# Patient Record
Sex: MALE | Race: WHITE | NOT HISPANIC OR LATINO | URBAN - METROPOLITAN AREA
[De-identification: names, ages, dates, MRNs, and addresses within clinical notes are randomized per-mention and may not be internally consistent; named-entity substitution may affect disease eponyms.]

---

## 2019-03-04 ENCOUNTER — EMERGENCY (EMERGENCY)
Facility: HOSPITAL | Age: 24
LOS: 1 days | Discharge: HOME | End: 2019-03-04
Attending: EMERGENCY MEDICINE
Payer: SELF-PAY

## 2019-03-04 VITALS
OXYGEN SATURATION: 99 % | SYSTOLIC BLOOD PRESSURE: 136 MMHG | RESPIRATION RATE: 16 BRPM | TEMPERATURE: 98 F | HEART RATE: 90 BPM | DIASTOLIC BLOOD PRESSURE: 82 MMHG

## 2019-03-04 PROCEDURE — 99283 EMERGENCY DEPT VISIT LOW MDM: CPT

## 2019-03-04 RX ORDER — DIAZEPAM 5 MG
1 TABLET ORAL
Qty: 9 | Refills: 0 | OUTPATIENT
Start: 2019-03-04 | End: 2019-03-06

## 2019-03-04 RX ORDER — IBUPROFEN 200 MG
1 TABLET ORAL
Qty: 40 | Refills: 0 | OUTPATIENT
Start: 2019-03-04 | End: 2019-03-13

## 2019-03-04 RX ORDER — IBUPROFEN 200 MG
600 TABLET ORAL ONCE
Qty: 0 | Refills: 0 | Status: COMPLETED | OUTPATIENT
Start: 2019-03-04 | End: 2019-03-04

## 2019-03-04 RX ADMIN — Medication 600 MILLIGRAM(S): at 13:36

## 2019-03-04 NOTE — ED PROVIDER NOTE - NSFOLLOWUPINSTRUCTIONS_ED_ALL_ED_FT
Any prolonged pain you can follow up with our spine center [id number]  Motrin 600 mg every 6 hours for pain  Valium 5 mg every 8 hours for extreme pain, you can not drive while taking this medication, most people prefer to take this before bed  Any new or worsening symptoms return to ER immediately

## 2019-03-04 NOTE — ED ADULT NURSE NOTE - NSIMPLEMENTINTERV_GEN_ALL_ED
Implemented All Universal Safety Interventions:  [facility] to call system. Call bell, personal items and telephone within reach. Instruct patient to call for assistance. Room bathroom lighting operational. Non-slip footwear when patient is off stretcher. Physically safe environment: no spills, clutter or unnecessary equipment. Stretcher in lowest position, wheels locked, appropriate side rails in place.

## 2019-03-04 NOTE — ED ADULT NURSE NOTE - OBJECTIVE STATEMENT
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 as per patient, patient slipped on slush and caught self from falling forwards. patient states lower back pain radiates to the anterior thigh. ambulatory. denies numbness or tingling. N/V/D, hitting head, LOC, incontinence, abdominal pain. skin intact. no swelling noted. VSS. PA at the beside.

## 2019-03-04 NOTE — ED PROVIDER NOTE - OBJECTIVE STATEMENT
23 y.o. male coming in with lower back pain after a slip and fall.  Pt started sliding on slipped ground, was falling forward but managed to catch himself with his right arm making his fall to the ground less severe however he did have a significant flexion in his LS spin as he went to the ground.  Did not hit his head, no neck pain, no other complaints of pain.  Currently with lower back pain with occasional sharp radiating do b/l hips and thighs.  No saddle anesthesia, no numbness, no weakness, no incontinence.  Has not taken anything for the pain, flexion makes it worse.

## 2019-03-04 NOTE — ED PROVIDER NOTE - ATTENDING CONTRIBUTION TO CARE
24 yo male who dorothea-knifed forward after slipping on ice but managed to catch himself before falling.  Jerking motion resulted in low back pain.  Able to ambulate with discomfort.  Otherwise well appearing on exam.  Conservative management, no imaging indicated @ this time.

## 2023-11-25 ENCOUNTER — EMERGENCY (EMERGENCY)
Facility: HOSPITAL | Age: 28
LOS: 1 days | Discharge: HOME | End: 2023-11-25
Attending: EMERGENCY MEDICINE | Admitting: EMERGENCY MEDICINE
Payer: COMMERCIAL

## 2023-11-25 VITALS
RESPIRATION RATE: 16 BRPM | SYSTOLIC BLOOD PRESSURE: 145 MMHG | OXYGEN SATURATION: 100 % | DIASTOLIC BLOOD PRESSURE: 96 MMHG | TEMPERATURE: 98 F | HEART RATE: 79 BPM

## 2023-11-25 VITALS
SYSTOLIC BLOOD PRESSURE: 129 MMHG | RESPIRATION RATE: 18 BRPM | HEART RATE: 74 BPM | DIASTOLIC BLOOD PRESSURE: 89 MMHG | OXYGEN SATURATION: 100 %

## 2023-11-25 LAB
ALBUMIN SERPL ELPH-MCNC: 4.5 G/DL — SIGNIFICANT CHANGE UP (ref 3.3–5)
ALBUMIN SERPL ELPH-MCNC: 4.5 G/DL — SIGNIFICANT CHANGE UP (ref 3.3–5)
ALP SERPL-CCNC: 56 U/L — SIGNIFICANT CHANGE UP (ref 40–120)
ALP SERPL-CCNC: 56 U/L — SIGNIFICANT CHANGE UP (ref 40–120)
ALT FLD-CCNC: 23 U/L — SIGNIFICANT CHANGE UP (ref 4–41)
ALT FLD-CCNC: 23 U/L — SIGNIFICANT CHANGE UP (ref 4–41)
ANION GAP SERPL CALC-SCNC: 11 MMOL/L — SIGNIFICANT CHANGE UP (ref 7–14)
ANION GAP SERPL CALC-SCNC: 11 MMOL/L — SIGNIFICANT CHANGE UP (ref 7–14)
AST SERPL-CCNC: 25 U/L — SIGNIFICANT CHANGE UP (ref 4–40)
AST SERPL-CCNC: 25 U/L — SIGNIFICANT CHANGE UP (ref 4–40)
BASOPHILS # BLD AUTO: 0.03 K/UL — SIGNIFICANT CHANGE UP (ref 0–0.2)
BASOPHILS # BLD AUTO: 0.03 K/UL — SIGNIFICANT CHANGE UP (ref 0–0.2)
BASOPHILS NFR BLD AUTO: 0.3 % — SIGNIFICANT CHANGE UP (ref 0–2)
BASOPHILS NFR BLD AUTO: 0.3 % — SIGNIFICANT CHANGE UP (ref 0–2)
BILIRUB SERPL-MCNC: <0.2 MG/DL — SIGNIFICANT CHANGE UP (ref 0.2–1.2)
BILIRUB SERPL-MCNC: <0.2 MG/DL — SIGNIFICANT CHANGE UP (ref 0.2–1.2)
BUN SERPL-MCNC: 18 MG/DL — SIGNIFICANT CHANGE UP (ref 7–23)
BUN SERPL-MCNC: 18 MG/DL — SIGNIFICANT CHANGE UP (ref 7–23)
CALCIUM SERPL-MCNC: 9.4 MG/DL — SIGNIFICANT CHANGE UP (ref 8.4–10.5)
CALCIUM SERPL-MCNC: 9.4 MG/DL — SIGNIFICANT CHANGE UP (ref 8.4–10.5)
CHLORIDE SERPL-SCNC: 103 MMOL/L — SIGNIFICANT CHANGE UP (ref 98–107)
CHLORIDE SERPL-SCNC: 103 MMOL/L — SIGNIFICANT CHANGE UP (ref 98–107)
CO2 SERPL-SCNC: 26 MMOL/L — SIGNIFICANT CHANGE UP (ref 22–31)
CO2 SERPL-SCNC: 26 MMOL/L — SIGNIFICANT CHANGE UP (ref 22–31)
CREAT SERPL-MCNC: 1.02 MG/DL — SIGNIFICANT CHANGE UP (ref 0.5–1.3)
CREAT SERPL-MCNC: 1.02 MG/DL — SIGNIFICANT CHANGE UP (ref 0.5–1.3)
EGFR: 101 ML/MIN/1.73M2 — SIGNIFICANT CHANGE UP
EGFR: 101 ML/MIN/1.73M2 — SIGNIFICANT CHANGE UP
EOSINOPHIL # BLD AUTO: 0.08 K/UL — SIGNIFICANT CHANGE UP (ref 0–0.5)
EOSINOPHIL # BLD AUTO: 0.08 K/UL — SIGNIFICANT CHANGE UP (ref 0–0.5)
EOSINOPHIL NFR BLD AUTO: 0.9 % — SIGNIFICANT CHANGE UP (ref 0–6)
EOSINOPHIL NFR BLD AUTO: 0.9 % — SIGNIFICANT CHANGE UP (ref 0–6)
GLUCOSE SERPL-MCNC: 95 MG/DL — SIGNIFICANT CHANGE UP (ref 70–99)
GLUCOSE SERPL-MCNC: 95 MG/DL — SIGNIFICANT CHANGE UP (ref 70–99)
HCT VFR BLD CALC: 43.2 % — SIGNIFICANT CHANGE UP (ref 39–50)
HCT VFR BLD CALC: 43.2 % — SIGNIFICANT CHANGE UP (ref 39–50)
HGB BLD-MCNC: 14.7 G/DL — SIGNIFICANT CHANGE UP (ref 13–17)
HGB BLD-MCNC: 14.7 G/DL — SIGNIFICANT CHANGE UP (ref 13–17)
IANC: 6.08 K/UL — SIGNIFICANT CHANGE UP (ref 1.8–7.4)
IANC: 6.08 K/UL — SIGNIFICANT CHANGE UP (ref 1.8–7.4)
IMM GRANULOCYTES NFR BLD AUTO: 0.2 % — SIGNIFICANT CHANGE UP (ref 0–0.9)
IMM GRANULOCYTES NFR BLD AUTO: 0.2 % — SIGNIFICANT CHANGE UP (ref 0–0.9)
LYMPHOCYTES # BLD AUTO: 1.77 K/UL — SIGNIFICANT CHANGE UP (ref 1–3.3)
LYMPHOCYTES # BLD AUTO: 1.77 K/UL — SIGNIFICANT CHANGE UP (ref 1–3.3)
LYMPHOCYTES # BLD AUTO: 20.4 % — SIGNIFICANT CHANGE UP (ref 13–44)
LYMPHOCYTES # BLD AUTO: 20.4 % — SIGNIFICANT CHANGE UP (ref 13–44)
MCHC RBC-ENTMCNC: 29.3 PG — SIGNIFICANT CHANGE UP (ref 27–34)
MCHC RBC-ENTMCNC: 29.3 PG — SIGNIFICANT CHANGE UP (ref 27–34)
MCHC RBC-ENTMCNC: 34 GM/DL — SIGNIFICANT CHANGE UP (ref 32–36)
MCHC RBC-ENTMCNC: 34 GM/DL — SIGNIFICANT CHANGE UP (ref 32–36)
MCV RBC AUTO: 86.1 FL — SIGNIFICANT CHANGE UP (ref 80–100)
MCV RBC AUTO: 86.1 FL — SIGNIFICANT CHANGE UP (ref 80–100)
MONOCYTES # BLD AUTO: 0.7 K/UL — SIGNIFICANT CHANGE UP (ref 0–0.9)
MONOCYTES # BLD AUTO: 0.7 K/UL — SIGNIFICANT CHANGE UP (ref 0–0.9)
MONOCYTES NFR BLD AUTO: 8.1 % — SIGNIFICANT CHANGE UP (ref 2–14)
MONOCYTES NFR BLD AUTO: 8.1 % — SIGNIFICANT CHANGE UP (ref 2–14)
NEUTROPHILS # BLD AUTO: 6.08 K/UL — SIGNIFICANT CHANGE UP (ref 1.8–7.4)
NEUTROPHILS # BLD AUTO: 6.08 K/UL — SIGNIFICANT CHANGE UP (ref 1.8–7.4)
NEUTROPHILS NFR BLD AUTO: 70.1 % — SIGNIFICANT CHANGE UP (ref 43–77)
NEUTROPHILS NFR BLD AUTO: 70.1 % — SIGNIFICANT CHANGE UP (ref 43–77)
NRBC # BLD: 0 /100 WBCS — SIGNIFICANT CHANGE UP (ref 0–0)
NRBC # BLD: 0 /100 WBCS — SIGNIFICANT CHANGE UP (ref 0–0)
NRBC # FLD: 0 K/UL — SIGNIFICANT CHANGE UP (ref 0–0)
NRBC # FLD: 0 K/UL — SIGNIFICANT CHANGE UP (ref 0–0)
PLATELET # BLD AUTO: 272 K/UL — SIGNIFICANT CHANGE UP (ref 150–400)
PLATELET # BLD AUTO: 272 K/UL — SIGNIFICANT CHANGE UP (ref 150–400)
POTASSIUM SERPL-MCNC: 4.1 MMOL/L — SIGNIFICANT CHANGE UP (ref 3.5–5.3)
POTASSIUM SERPL-MCNC: 4.1 MMOL/L — SIGNIFICANT CHANGE UP (ref 3.5–5.3)
POTASSIUM SERPL-SCNC: 4.1 MMOL/L — SIGNIFICANT CHANGE UP (ref 3.5–5.3)
POTASSIUM SERPL-SCNC: 4.1 MMOL/L — SIGNIFICANT CHANGE UP (ref 3.5–5.3)
PROT SERPL-MCNC: 7.1 G/DL — SIGNIFICANT CHANGE UP (ref 6–8.3)
PROT SERPL-MCNC: 7.1 G/DL — SIGNIFICANT CHANGE UP (ref 6–8.3)
RBC # BLD: 5.02 M/UL — SIGNIFICANT CHANGE UP (ref 4.2–5.8)
RBC # BLD: 5.02 M/UL — SIGNIFICANT CHANGE UP (ref 4.2–5.8)
RBC # FLD: 12.3 % — SIGNIFICANT CHANGE UP (ref 10.3–14.5)
RBC # FLD: 12.3 % — SIGNIFICANT CHANGE UP (ref 10.3–14.5)
SODIUM SERPL-SCNC: 140 MMOL/L — SIGNIFICANT CHANGE UP (ref 135–145)
SODIUM SERPL-SCNC: 140 MMOL/L — SIGNIFICANT CHANGE UP (ref 135–145)
WBC # BLD: 8.68 K/UL — SIGNIFICANT CHANGE UP (ref 3.8–10.5)
WBC # BLD: 8.68 K/UL — SIGNIFICANT CHANGE UP (ref 3.8–10.5)
WBC # FLD AUTO: 8.68 K/UL — SIGNIFICANT CHANGE UP (ref 3.8–10.5)
WBC # FLD AUTO: 8.68 K/UL — SIGNIFICANT CHANGE UP (ref 3.8–10.5)

## 2023-11-25 PROCEDURE — 72125 CT NECK SPINE W/O DYE: CPT | Mod: 26,MA

## 2023-11-25 PROCEDURE — 71045 X-RAY EXAM CHEST 1 VIEW: CPT | Mod: 26

## 2023-11-25 PROCEDURE — 99285 EMERGENCY DEPT VISIT HI MDM: CPT

## 2023-11-25 PROCEDURE — 70450 CT HEAD/BRAIN W/O DYE: CPT | Mod: 26,MA

## 2023-11-25 PROCEDURE — 73030 X-RAY EXAM OF SHOULDER: CPT | Mod: 26,LT

## 2023-11-25 RX ORDER — MORPHINE SULFATE 50 MG/1
6 CAPSULE, EXTENDED RELEASE ORAL ONCE
Refills: 0 | Status: DISCONTINUED | OUTPATIENT
Start: 2023-11-25 | End: 2023-11-25

## 2023-11-25 RX ORDER — MORPHINE SULFATE 50 MG/1
4 CAPSULE, EXTENDED RELEASE ORAL ONCE
Refills: 0 | Status: DISCONTINUED | OUTPATIENT
Start: 2023-11-25 | End: 2023-11-25

## 2023-11-25 RX ORDER — IBUPROFEN 200 MG
1 TABLET ORAL
Qty: 21 | Refills: 0
Start: 2023-11-25 | End: 2023-12-01

## 2023-11-25 RX ORDER — OXYCODONE AND ACETAMINOPHEN 5; 325 MG/1; MG/1
1 TABLET ORAL ONCE
Refills: 0 | Status: DISCONTINUED | OUTPATIENT
Start: 2023-11-25 | End: 2023-11-25

## 2023-11-25 RX ORDER — OXYCODONE AND ACETAMINOPHEN 5; 325 MG/1; MG/1
1 TABLET ORAL
Qty: 12 | Refills: 0
Start: 2023-11-25 | End: 2023-11-30

## 2023-11-25 RX ORDER — SODIUM CHLORIDE 9 MG/ML
1000 INJECTION INTRAMUSCULAR; INTRAVENOUS; SUBCUTANEOUS ONCE
Refills: 0 | Status: COMPLETED | OUTPATIENT
Start: 2023-11-25 | End: 2023-11-25

## 2023-11-25 RX ADMIN — MORPHINE SULFATE 4 MILLIGRAM(S): 50 CAPSULE, EXTENDED RELEASE ORAL at 19:31

## 2023-11-25 RX ADMIN — MORPHINE SULFATE 6 MILLIGRAM(S): 50 CAPSULE, EXTENDED RELEASE ORAL at 18:40

## 2023-11-25 RX ADMIN — MORPHINE SULFATE 2 MILLIGRAM(S): 50 CAPSULE, EXTENDED RELEASE ORAL at 18:10

## 2023-11-25 RX ADMIN — SODIUM CHLORIDE 2000 MILLILITER(S): 9 INJECTION INTRAMUSCULAR; INTRAVENOUS; SUBCUTANEOUS at 18:20
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
## 2023-11-25 NOTE — ED PROVIDER NOTE - NS ED ATTENDING STATEMENT MOD
This was a shared visit with the NATA. I reviewed and verified the documentation and independently performed the documented:

## 2023-11-25 NOTE — ED ADULT NURSE REASSESSMENT NOTE - NS ED NURSE REASSESS COMMENT FT1
Patient resting in stretcher A&OX4, accompanied by family member, RR equal and unlabored. Medicated as ordered for pain to the Left shoulder. Care plan continued. Comfort measures provided. Safety maintained. Awaiting imaging.

## 2023-11-25 NOTE — ED PROVIDER NOTE - PROGRESS NOTE DETAILS
[facility]- discussed in great details all results,  follow up instruction with ortho, sling in placed. script sent to pharmacy

## 2023-11-25 NOTE — ED PROVIDER NOTE - PHYSICAL EXAMINATION
ATTENDING PHYSICAL EXAM  GEN - Awake, alert, obvious pain with movement.  HEAD - NC/AT; EYES/NOSE - PERRL, EOMI, mucous membranes moist, no discharge; THROAT: Oral cavity and pharynx normal. No inflammation, swelling, exudate, or lesions  NECK: Neck supple, non-tender without lymphadenopathy, no masses, no JVD  PULMONARY - CTA b/l, symmetric breath sounds, no w/r/r  CARDIAC -s1s2, RRR, no M,R,G  ABDOMEN - +NABS, ND, NT, soft, no guarding, no rebound, no pelvic instability  BACK - no CVA tenderness, No vertebral or paravertebral tenderness  EXTREMITIES - + tenderness at left AC joint.  No obvious clavicle deformity or tenting.  Left arm, elbow, forearm without deformity or tenderness. Unable to move left shoulder due to pain. left wrist and hand with normal motor and sensory function as well.  Neuro - CNs 2-12 intact.  PMS intact x 4 exts.  No focal deficits

## 2023-11-25 NOTE — ED PROVIDER NOTE - NSFOLLOWUPINSTRUCTIONS_ED_ALL_ED_FT
Please keep sling in place and follow up with orthopedist.     The following are a list of orthopedic clinics in the surrounding area    [facility]  Orthopedic Surgery  [address] [place] [id number]  Phone: [phone]  Fax:   Follow Up Time:     [facility]  Orthopedics  [address] [place] [address]  Phone: [phone]  Fax: [phone]  Follow Up Time:     [facility]  Orthopedics  [address] [place] [id number]    You can use 400-600mg Ibuprofen (such as motrin or advil) every 6 to 8 hours as needed for pain control.  Take ibuprofen with food or milk to lessen stomach upset.  This is an over-the-counter medication please respect the warnings on the label. All medications come with certain risks and side effects that you need to discuss with your doctor, especially if you are taking them for a prolonged period.      You can use 500-1000mg Tylenol every 6 hours for pain - as needed.  This is an over-the-counter medications - please respect the warnings on the label. This medication come with certain risks and side effects that you need to discuss with your doctor, especially if you are taking it for a prolonged period.
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
## 2023-11-26 NOTE — ED POST DISCHARGE NOTE - RESULT SUMMARY
Lt shoulder Xray : superior alignment of the distal clavicle at the acromion, consistent with acromioclavicular joint separation. Pt diagnosed with an injury of LT acromioclavicular joint and put in a sling and told to follow up with Ortho. Patient contacted and told again of Lt acromioclavicular joint injury and to wear sling and follow up with Ortho. Discussed with patient need to return to ED if symptoms don't continue to improve or recur or develops any new or worsening symptoms that are of concern. Patient verbalizes understanding of what has been told to him.

## 2024-01-05 PROBLEM — Z00.00 ENCOUNTER FOR PREVENTIVE HEALTH EXAMINATION: Status: ACTIVE | Noted: 2024-01-05

## 2024-10-25 ENCOUNTER — TRANSCRIPTION ENCOUNTER (OUTPATIENT)
Age: 29
End: 2024-10-25

## 2024-10-25 ENCOUNTER — EMERGENCY (EMERGENCY)
Facility: HOSPITAL | Age: 29
LOS: 1 days | Discharge: HOME | End: 2024-10-25
Attending: EMERGENCY MEDICINE
Payer: SELF-PAY

## 2024-10-25 VITALS
HEART RATE: 54 BPM | RESPIRATION RATE: 18 BRPM | WEIGHT: 190.04 LBS | OXYGEN SATURATION: 100 % | HEIGHT: 70 IN | DIASTOLIC BLOOD PRESSURE: 90 MMHG | TEMPERATURE: 98 F | SYSTOLIC BLOOD PRESSURE: 145 MMHG

## 2024-10-25 LAB
ALBUMIN SERPL ELPH-MCNC: 4.9 G/DL — SIGNIFICANT CHANGE UP (ref 3.3–5)
ALP SERPL-CCNC: 59 U/L — SIGNIFICANT CHANGE UP (ref 40–120)
ALT FLD-CCNC: 15 U/L — SIGNIFICANT CHANGE UP (ref 10–45)
ANION GAP SERPL CALC-SCNC: 12 MMOL/L — SIGNIFICANT CHANGE UP (ref 5–17)
APTT BLD: 31 SEC — SIGNIFICANT CHANGE UP (ref 24.5–35.6)
AST SERPL-CCNC: 21 U/L — SIGNIFICANT CHANGE UP (ref 10–40)
BASOPHILS # BLD AUTO: 0.04 K/UL — SIGNIFICANT CHANGE UP (ref 0–0.2)
BASOPHILS NFR BLD AUTO: 0.7 % — SIGNIFICANT CHANGE UP (ref 0–2)
BILIRUB SERPL-MCNC: 0.3 MG/DL — SIGNIFICANT CHANGE UP (ref 0.2–1.2)
BUN SERPL-MCNC: 14 MG/DL — SIGNIFICANT CHANGE UP (ref 7–23)
CALCIUM SERPL-MCNC: 9.5 MG/DL — SIGNIFICANT CHANGE UP (ref 8.4–10.5)
CHLORIDE SERPL-SCNC: 100 MMOL/L — SIGNIFICANT CHANGE UP (ref 96–108)
CK MB CFR SERPL CALC: 2.1 NG/ML — SIGNIFICANT CHANGE UP (ref 0–6.7)
CO2 SERPL-SCNC: 26 MMOL/L — SIGNIFICANT CHANGE UP (ref 22–31)
CREAT SERPL-MCNC: 1.15 MG/DL — SIGNIFICANT CHANGE UP (ref 0.5–1.3)
EGFR: 87 ML/MIN/1.73M2 — SIGNIFICANT CHANGE UP
EOSINOPHIL # BLD AUTO: 0.13 K/UL — SIGNIFICANT CHANGE UP (ref 0–0.5)
EOSINOPHIL NFR BLD AUTO: 2.3 % — SIGNIFICANT CHANGE UP (ref 0–6)
GLUCOSE SERPL-MCNC: 90 MG/DL — SIGNIFICANT CHANGE UP (ref 70–99)
HCT VFR BLD CALC: 44.2 % — SIGNIFICANT CHANGE UP (ref 39–50)
HGB BLD-MCNC: 14.8 G/DL — SIGNIFICANT CHANGE UP (ref 13–17)
IMM GRANULOCYTES NFR BLD AUTO: 0.2 % — SIGNIFICANT CHANGE UP (ref 0–0.9)
INR BLD: 1.02 RATIO — SIGNIFICANT CHANGE UP (ref 0.85–1.16)
LYMPHOCYTES # BLD AUTO: 1.95 K/UL — SIGNIFICANT CHANGE UP (ref 1–3.3)
LYMPHOCYTES # BLD AUTO: 35.1 % — SIGNIFICANT CHANGE UP (ref 13–44)
MCHC RBC-ENTMCNC: 28.7 PG — SIGNIFICANT CHANGE UP (ref 27–34)
MCHC RBC-ENTMCNC: 33.5 GM/DL — SIGNIFICANT CHANGE UP (ref 32–36)
MCV RBC AUTO: 85.7 FL — SIGNIFICANT CHANGE UP (ref 80–100)
MONOCYTES # BLD AUTO: 0.63 K/UL — SIGNIFICANT CHANGE UP (ref 0–0.9)
MONOCYTES NFR BLD AUTO: 11.3 % — SIGNIFICANT CHANGE UP (ref 2–14)
NEUTROPHILS # BLD AUTO: 2.8 K/UL — SIGNIFICANT CHANGE UP (ref 1.8–7.4)
NEUTROPHILS NFR BLD AUTO: 50.4 % — SIGNIFICANT CHANGE UP (ref 43–77)
NRBC # BLD: 0 /100 WBCS — SIGNIFICANT CHANGE UP (ref 0–0)
PLATELET # BLD AUTO: 254 K/UL — SIGNIFICANT CHANGE UP (ref 150–400)
POTASSIUM SERPL-MCNC: 3.9 MMOL/L — SIGNIFICANT CHANGE UP (ref 3.5–5.3)
POTASSIUM SERPL-SCNC: 3.9 MMOL/L — SIGNIFICANT CHANGE UP (ref 3.5–5.3)
PROT SERPL-MCNC: 7.7 G/DL — SIGNIFICANT CHANGE UP (ref 6–8.3)
PROTHROM AB SERPL-ACNC: 11.7 SEC — SIGNIFICANT CHANGE UP (ref 9.9–13.4)
RBC # BLD: 5.16 M/UL — SIGNIFICANT CHANGE UP (ref 4.2–5.8)
RBC # FLD: 12.2 % — SIGNIFICANT CHANGE UP (ref 10.3–14.5)
SODIUM SERPL-SCNC: 138 MMOL/L — SIGNIFICANT CHANGE UP (ref 135–145)
TROPONIN T, HIGH SENSITIVITY RESULT: <6 NG/L — SIGNIFICANT CHANGE UP (ref 0–51)
TROPONIN T, HIGH SENSITIVITY RESULT: <6 NG/L — SIGNIFICANT CHANGE UP (ref 0–51)
TSH SERPL-MCNC: 1.7 UIU/ML — SIGNIFICANT CHANGE UP (ref 0.27–4.2)
WBC # BLD: 5.56 K/UL — SIGNIFICANT CHANGE UP (ref 3.8–10.5)
WBC # FLD AUTO: 5.56 K/UL — SIGNIFICANT CHANGE UP (ref 3.8–10.5)

## 2024-10-25 PROCEDURE — 71046 X-RAY EXAM CHEST 2 VIEWS: CPT | Mod: 26

## 2024-10-25 PROCEDURE — 99223 1ST HOSP IP/OBS HIGH 75: CPT

## 2024-10-25 RX ORDER — SODIUM CHLORIDE 0.9 % (FLUSH) 0.9 %
500 SYRINGE (ML) INJECTION ONCE
Refills: 0 | Status: COMPLETED | OUTPATIENT
Start: 2024-10-25 | End: 2024-10-25

## 2024-10-25 RX ADMIN — Medication 250 MILLILITER(S): at 21:47

## 2024-10-25 NOTE — ED ADULT NURSE NOTE - OBJECTIVE STATEMENT
28 y/o male arrives to the ER complaining of chest pain. Pt reports developing intermittent chest pain while listening a lecture this morning around 10 am. Pt reports feeling irregular heart beat when the chest pain comes. Reports having a 12 lead E[facility] done at [facility] having couplets of PVC's at the time he had chest pain. EMS reports given 324 ASA prior arrival to the ED.  Pt denies any radiation, SOB, chest pain, dizziness.  On assessment pt is well appearing, A&Ox4, speaking coherently, airway is patent, breathing spontaneously and unlabored. Skin is dry, warm. Abdomen is soft, no distended, no tender. Full ROM in all extremities.  Patient undressed and placed into gown, EKG performed, placed on continuous cardiac monitor, 18 G placed on the RAC, blood sent to the lab. Comfort and safety provided. side rails up with bed locked and in lowest position for safety. call bell within reach. [place] provided.

## 2024-10-25 NOTE — ED PROVIDER NOTE - WR INTERPRETATION 1
X-ray films of chest independently interpreted by me, Dr Sebastian Hayes, and shows no acute cardiopulmonary process

## 2024-10-25 NOTE — ED ADULT NURSE REASSESSMENT NOTE - NS ED NURSE REASSESS COMMENT FT1
Pt received from [facility]. Pt oriented to CDU & plan of care was discussed. Pt A&O x 4. Independent. Pt in CDU for telemetry, TTE, and Cardiology following. Pt denies any chest pain, SOB, dizziness or palpitations at this time. V/S stable, pt afebrile, L AC 18g IV in place, patent and free of signs of infiltration. Pt resting in bed. Safety & comfort measures maintained. Call bell in reach. Care continues.

## 2024-10-25 NOTE — ED PROVIDER NOTE - PHYSICAL EXAMINATION
GENERAL: no acute distress, non-toxic appearing  HEAD: normocephalic, atraumatic  HEENT: oral mucosa moist, full ROM of neck  CARDIAC: regular rate rhythm, normal S1/S2  CHEST: CTA BL, no wheeze or crackles  ABDOMEN: normal BS, soft, no tenderness  EXTREMITY: no gross deformity, no edema, good perfusion   NEURO: alert and orientedx3

## 2024-10-25 NOTE — ED PROVIDER NOTE - OBJECTIVE STATEMENT
[facility],  (PGY2): 30 y/o male, no significant medical history, presenting to the ED today for episode of palpitations associated with chest discomfort. Palpitations initially started and then patient felt discomfort. Patient reports that he was sitting at a lecture when he felt palpitations, put himself on a monitor and noted 3-4 beats of PVC. Received aspirin in route. Reports chest discomfort now. Otherwise, no nausea, vomiting, diaphoresis, neuro symptoms.

## 2024-10-25 NOTE — ED CDU PROVIDER INITIAL DAY NOTE - PROGRESS NOTE DETAILS
Received strip report from war room showing intermittent idioventricular rhythm. HR 45-85. pt states he feels palpitations intermittently and that the palpitations are painful. repeat troponin sent. cardiology fellow called, they will come see pt. EKG showing sinus bradycardia, TWI lead III, and Twave flattening in V6. discussed with Dr. Martino. -Sandra Wright PA-C Cardiology evaluating patient. - Suzanne Zabala PA-C Spoke with cardiology fellow, recommending giving IVF (500cc), states patient with PVCs, would follow up echocardiogram. - EMEKA FanC

## 2024-10-25 NOTE — ED CDU PROVIDER INITIAL DAY NOTE - OBJECTIVE STATEMENT
28 y/o male with no pmh presents complaining of chest pain. 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 Pt reports feeling irregular heart beat when the chest pain came. pt states he put himself on a cardiac monitor and it showed multiple pvc's in couplets and triplets. Pt denies any radiation of pain, SOB, dizziness.  no fevers or chills.

## 2024-10-25 NOTE — ED ADULT NURSE NOTE - NSFALLUNIVINTERV_ED_ALL_ED
Bed/Stretcher in lowest position, wheels locked, appropriate side rails in place/Call bell, personal items and telephone in reach/Instruct patient to call for assistance before getting out of bed/chair/stretcher/Non-slip footwear applied when patient is off stretcher/[place] to call system/Physically safe environment - no spills, clutter or unnecessary equipment/Purposeful proactive rounding/Room/bathroom lighting operational, light cord in reach

## 2024-10-25 NOTE — ED PROVIDER NOTE - CLINICAL SUMMARY MEDICAL DECISION MAKING FREE TEXT BOX
Attending MD Hayes:  29-year-old gentleman with no known medical history is presenting for evaluation of what initially started as palpitations however developed into chest discomfort.  The patient was in a lecture and felt the palpitations so put himself on the monitor, he noted the it he was having occasional PVCs however then he had a run of 3 PVCs in a row and when he was having this he felt like he was being punched in the chest.  Denies any nausea or vomiting diaphoresis or near syncope.  Still has some soreness in the chest at the time my interview.  Denies any recreational drug use.  He does drink coffee every day but no other medications.    Patient's vital signs are notable for blood pressure 145 systolic otherwise nonactionable.  He is sitting in the stretcher in no apparent distress.  Regular heart sounds without obvious murmur clear lungs anteriorly regular heart sounds.  No significant peripheral edema.    ECG strip reviewed that patient himself obtained and did show triplets of PVCs.    Patient presenting for evaluation of palpitations chest discomfort and frequent PVCs, plan at this time will be to obtain electrolytes to rule out electrolyte derangements, screening cardiac biomarkers maintain on telemetry and consider observation for echocardiogram to rule out structural heart disease          *The above represents an initial assessment/impression. Please refer to progress notes for potential changes in patient clinical course*

## 2024-10-25 NOTE — ED CDU PROVIDER INITIAL DAY NOTE - ATTENDING APP SHARED VISIT CONTRIBUTION OF CARE
Attending MD Hayes: I personally made/approved the management plan and take responsibility for the patient management.

## 2024-10-25 NOTE — ED PROVIDER NOTE - ATTENDING CONTRIBUTION TO CARE
Attending MD Hayes:  I have seen and examined this patient and fully participated in the care of this patient as the teaching attending. I personally made/approved the management plan and take responsibility for the patient management.

## 2024-10-25 NOTE — ED PROVIDER NOTE - OTHER FINDINGS
ECG recorded at 1304 independently interpreted by me , Dr Sebastian Hayes,  at 1313 shows normal sinus rhythm normal axis  ms otherwise normal intervals.  T wave inversion lead aVF, no ST elevation or ST depression.  Slight T wave flattening V5 V6.

## 2024-10-26 ENCOUNTER — RESULT REVIEW (OUTPATIENT)
Age: 29
End: 2024-10-26

## 2024-10-26 VITALS
OXYGEN SATURATION: 100 % | HEART RATE: 47 BPM | SYSTOLIC BLOOD PRESSURE: 111 MMHG | RESPIRATION RATE: 17 BRPM | TEMPERATURE: 98 F | DIASTOLIC BLOOD PRESSURE: 70 MMHG

## 2024-10-26 LAB
A1C WITH ESTIMATED AVERAGE GLUCOSE RESULT: 5.4 % — SIGNIFICANT CHANGE UP (ref 4–5.6)
ANION GAP SERPL CALC-SCNC: 10 MMOL/L — SIGNIFICANT CHANGE UP (ref 5–17)
BUN SERPL-MCNC: 14 MG/DL — SIGNIFICANT CHANGE UP (ref 7–23)
CALCIUM SERPL-MCNC: 9.1 MG/DL — SIGNIFICANT CHANGE UP (ref 8.4–10.5)
CHLORIDE SERPL-SCNC: 108 MMOL/L — SIGNIFICANT CHANGE UP (ref 96–108)
CHOLEST SERPL-MCNC: 167 MG/DL — SIGNIFICANT CHANGE UP
CO2 SERPL-SCNC: 25 MMOL/L — SIGNIFICANT CHANGE UP (ref 22–31)
CREAT SERPL-MCNC: 1.13 MG/DL — SIGNIFICANT CHANGE UP (ref 0.5–1.3)
EGFR: 89 ML/MIN/1.73M2 — SIGNIFICANT CHANGE UP
ESTIMATED AVERAGE GLUCOSE: 108 MG/DL — SIGNIFICANT CHANGE UP (ref 68–114)
GLUCOSE SERPL-MCNC: 89 MG/DL — SIGNIFICANT CHANGE UP (ref 70–99)
HDLC SERPL-MCNC: 43 MG/DL — SIGNIFICANT CHANGE UP
LIPID PNL WITH DIRECT LDL SERPL: 113 MG/DL — HIGH
MAGNESIUM SERPL-MCNC: 2.1 MG/DL — SIGNIFICANT CHANGE UP (ref 1.6–2.6)
NON HDL CHOLESTEROL: 124 MG/DL — SIGNIFICANT CHANGE UP
POTASSIUM SERPL-MCNC: 4.2 MMOL/L — SIGNIFICANT CHANGE UP (ref 3.5–5.3)
POTASSIUM SERPL-SCNC: 4.2 MMOL/L — SIGNIFICANT CHANGE UP (ref 3.5–5.3)
SODIUM SERPL-SCNC: 143 MMOL/L — SIGNIFICANT CHANGE UP (ref 135–145)
TRIGL SERPL-MCNC: 56 MG/DL — SIGNIFICANT CHANGE UP

## 2024-10-26 PROCEDURE — 93356 MYOCRD STRAIN IMG SPCKL TRCK: CPT

## 2024-10-26 PROCEDURE — 93306 TTE W/DOPPLER COMPLETE: CPT

## 2024-10-26 PROCEDURE — 36415 COLL VENOUS BLD VENIPUNCTURE: CPT

## 2024-10-26 PROCEDURE — 71046 X-RAY EXAM CHEST 2 VIEWS: CPT

## 2024-10-26 PROCEDURE — G0378: CPT

## 2024-10-26 PROCEDURE — 93306 TTE W/DOPPLER COMPLETE: CPT | Mod: 26

## 2024-10-26 PROCEDURE — 85610 PROTHROMBIN TIME: CPT

## 2024-10-26 PROCEDURE — 80061 LIPID PANEL: CPT

## 2024-10-26 PROCEDURE — 85025 COMPLETE CBC W/AUTO DIFF WBC: CPT

## 2024-10-26 PROCEDURE — 99285 EMERGENCY DEPT VISIT HI MDM: CPT | Mod: 25

## 2024-10-26 PROCEDURE — 85730 THROMBOPLASTIN TIME PARTIAL: CPT

## 2024-10-26 PROCEDURE — 84443 ASSAY THYROID STIM HORMONE: CPT

## 2024-10-26 PROCEDURE — 82553 CREATINE MB FRACTION: CPT

## 2024-10-26 PROCEDURE — 83735 ASSAY OF MAGNESIUM: CPT

## 2024-10-26 PROCEDURE — 84484 ASSAY OF TROPONIN QUANT: CPT

## 2024-10-26 PROCEDURE — 93005 ELECTROCARDIOGRAM TRACING: CPT | Mod: 76

## 2024-10-26 PROCEDURE — 83036 HEMOGLOBIN GLYCOSYLATED A1C: CPT

## 2024-10-26 PROCEDURE — 80053 COMPREHEN METABOLIC PANEL: CPT

## 2024-10-26 PROCEDURE — 99238 HOSP IP/OBS DSCHRG MGMT 30/<: CPT

## 2024-10-26 PROCEDURE — 80048 BASIC METABOLIC PNL TOTAL CA: CPT

## 2024-10-26 PROCEDURE — 99204 OFFICE O/P NEW MOD 45 MIN: CPT

## 2024-10-26 NOTE — ED CDU PROVIDER DISPOSITION NOTE - CARE PROVIDER_API CALL
Jose E Marques  Cardiovascular Disease  [address] [facility]  [place] [address]  Phone: [phone]  Fax: [phone]  Follow Up Time:

## 2024-10-26 NOTE — CONSULT NOTE ADULT - ASSESSMENT
29 year old M w/ no known pmh who presents with palpitations and chest pain found to having frequent ventricular ectopy. Cardiology consulted for premature ventricular contraction.     EKG: 10/25/2024: Normal sinus rhythm with sinus bradycardia. Notched T wave.   Telemetry: Occasional PVC's. Longest run of 3 PVCs.   Biomarkers: Troponin <6 - > <6.     Impression: Patient is having palpitations and chest pain iso of frequent ectopy. At this time, PVC burden and LV function is unclear. This is his first symptomatic episode of PVCs and he does not have any 12 lead EKGs capturing his PVCs to assess for localization. PVCs typically occur at outflow tract or papillary muscles.     Recommendations:   #Premature ventricular contractions   - Check TTE   -  29 year old M w/ no known pmh who presents with palpitations and chest pain found to having frequent ventricular ectopy. Cardiology consulted for premature ventricular contraction.     EKG: 10/25/2024: Normal sinus rhythm with sinus bradycardia. Notched T wave.   Telemetry: Occasional PVC's. Longest run of 3 PVCs.   Biomarkers: Troponin <6 - > <6.     Impression: Patient is having palpitations and chest pain iso of frequent ectopy. At this time, PVC burden and LV function is unclear. This is his first symptomatic episode of PVCs and he does not have any 12 lead EKGs capturing his PVCs to assess for localization. PVCs typically occur at outflow tract or papillary muscles. Patient is currently asymptomatic but would benefit from LV function assessment and if normal would favor outpatient assessment of PVC burden for consideration of treatment options. Beta blockers are 1st line but patients HR ranges from 39-60 at rest limiting their utility. Given age amiodarone would be better avoided. Flecainide could be considered with caution or observation. At this time, I would opt for no pharmacotherapy and observation with LV function assessment.     Recommendations:   #Premature ventricular contractions   - Check TTE   - CTM on telemetry  - Avoid AV ivone blockers   - Could consider ECG stress to assess exertional induced increases in PVC burden   - Replete K >4 and Mg > 2   - If recurrent symptoms could see EP team outpatient for evaluation of role of ablation   - Contact Cardiology with any clinical concerns or should patient develop NSVT or sustained VT.     Hai Arora MD   Cardiology Fellow  29 year old M w/ no known pmh who presents with palpitations and chest pain found to having frequent ventricular ectopy. Cardiology consulted for premature ventricular contraction.     EKG: 10/25/2024: Normal sinus rhythm with sinus bradycardia. Notched T wave.   Telemetry: Occasional PVC's. Longest run of 3 PVCs.   Biomarkers: Troponin <6 - > <6.     Impression: Patient is having palpitations and chest pain iso of frequent ectopy. At this time, PVC burden and LV function is unclear. This is his first symptomatic episode of PVCs and he does not have any 12 lead EKGs capturing his PVCs to assess for localization. PVCs typically occur at outflow tract or papillary muscles. Patient is currently asymptomatic but would benefit from LV function assessment and if normal would favor outpatient assessment of PVC burden for consideration of treatment options. Beta blockers are 1st line but patients HR ranges from 39-60 at rest limiting their utility. Given age amiodarone would be better avoided. Flecainide could be considered with caution or observation. At this time, I would opt for no pharmacotherapy and observation with LV function assessment.     Recommendations:   #Premature ventricular contractions   - Check TTE   - CTM on telemetry  - Avoid AV ivone blockers   - Could consider ECG stress to assess exertional induced increases in PVC burden   - Check TSH/T3/T4  - Replete K >4 and Mg > 2   - If recurrent symptoms could see EP team outpatient for evaluation of role of ablation   - Contact Cardiology with any clinical concerns or should patient develop NSVT or sustained VT.     Hai Arora MD   Cardiology Fellow

## 2024-10-26 NOTE — ED CDU PROVIDER SUBSEQUENT DAY NOTE - PROGRESS NOTE DETAILS
On tele currently sinus bradycardia in the 40s. Overnight was called by tele room, with HR briefly dropping into the 30s with prompt increase in the 40s. Patient with no new complaints at the time, resting in bed. - Suzanne Zabala PA-C [facility] [place]: Patient seen at bedside in NAD.  VSS.  Patient resting comfortably without complaints. no events over tele. cleared for discharge per ED attending Dr Caicedo. will have pt f/u cards

## 2024-10-26 NOTE — CONSULT NOTE ADULT - SUBJECTIVE AND OBJECTIVE BOX
Cardiology Consult Note   [Please check [email or web address] password: [email or web address]" for cardiology service schedule and contact information]    HPI:      PAST MEDICAL & SURGICAL HISTORY:  No pertinent past medical history      No significant past surgical history      No significant past surgical history        FAMILY HISTORY:  No pertinent family history in first degree relatives      SOCIAL HISTORY:  unchanged    MEDICATIONS:                    -------------------------------------------------------------------------------------------  PHYSICAL EXAM:  T(C): 36.6 (10-25-24 @ 23:54), Max: 36.8 (10-25-24 @ 12:38)  HR: 53 (10-25-24 @ 23:54) (44 - 66)  BP: 109/67 (10-25-24 @ 23:54) (109/67 - 145/90)  RR: 16 (10-25-24 @ 23:54) (14 - 18)  SpO2: 97% (10-25-24 @ 23:54) (97% - 100%)  Wt(kg): --  I&O's Summary      GENERAL: NAD  HEAD: Atraumatic, Normocephalic.  ENT: Moist mucous membranes.  NECK: Supple, No JVD.  CHEST/LUNG: Clear to auscultation bilaterally; No rales, rhonchi, wheezing, or rubs. Unlabored respirations.  HEART: Regular rate and rhythm; No murmurs, rubs, or gallops.  ABDOMEN: Bowel sounds present; Soft, Nontender, Nondistended.   EXTREMITIES:  2+ Peripheral Pulses, brisk capillary refill. No clubbing, cyanosis, or edema.    -------------------------------------------------------------------------------------------  LABS:                          14.8   5.56  )-----------( 254      ( 25 Oct 2024 13:35 )             44.2     10-25    138  |  100  |  14  ----------------------------<  90  3.9   |  26  |  1.15    Ca    9.5      25 Oct 2024 13:35    TPro  7.7  /  Alb  4.9  /  TBili  0.3  /  DBili  x   /  AST  21  /  ALT  15  /  AlkPhos  59  10-25    PT/INR - ( 25 Oct 2024 13:35 )   PT: 11.7 sec;   INR: 1.02 ratio         PTT - ( 25 Oct 2024 13:35 )  PTT:31.0 sec  CARDIAC MARKERS ( 25 Oct 2024 18:51 )  <6 ng/L / x     / x     / x     / x     / x      CARDIAC MARKERS ( 25 Oct 2024 13:35 )  <6 ng/L / x     / x     / x     / x     / 2.1 ng/mL       Cardiology Consult Note   [Please check [email or web address] password: [email or web address]" for cardiology service schedule and contact information]    HPI: Patient is a 29 year old gentlemen without significant pmh who presents with 1 day of palpitations and associated chest pain that started the morning of 10/25/24. 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 He self set up a EKG lead and noticed he was having frequent PVCs associated with his palpitations. He tried to proceed with his day, but the frequency of the palpitations worsened and he noted that the intensity of these episodes was worse. When the episodes became painful he presented to the ER for further evaluation.     In the ER:   - Admitted to CDU  - Trops noted to be negative       PAST MEDICAL & SURGICAL HISTORY:  No pertinent past medical history      No significant past surgical history      No significant past surgical history        FAMILY HISTORY:  No pertinent family history in first degree relatives. No fam hx of heart disease       SOCIAL HISTORY:  unchanged    MEDICATIONS:  No meds    Allergies: Penicillin                 -------------------------------------------------------------------------------------------  PHYSICAL EXAM:  T(C): 36.6 (10-25-24 @ 23:54), Max: 36.8 (10-25-24 @ 12:38)  HR: 53 (10-25-24 @ 23:54) (44 - 66)  BP: 109/67 (10-25-24 @ 23:54) (109/67 - 145/90)  RR: 16 (10-25-24 @ 23:54) (14 - 18)  SpO2: 97% (10-25-24 @ 23:54) (97% - 100%)  Wt(kg): --  I&O's Summary      GENERAL: NAD  HEAD: Atraumatic, Normocephalic.  ENT: Moist mucous membranes.  NECK: Supple, No JVD.  CHEST/LUNG: Clear to auscultation bilaterally; No rales, rhonchi, wheezing, or rubs. Unlabored respirations.  HEART: Regular rate and rhythm; No murmurs, rubs, or gallops.  ABDOMEN: Bowel sounds present; Soft, Nontender, Nondistended.   EXTREMITIES:  2+ Peripheral Pulses, brisk capillary refill. No clubbing, cyanosis, or edema.    -------------------------------------------------------------------------------------------  LABS:                          14.8   5.56  )-----------( 254      ( 25 Oct 2024 13:35 )             44.2     10-25    138  |  100  |  14  ----------------------------<  90  3.9   |  26  |  1.15    Ca    9.5      25 Oct 2024 13:35    TPro  7.7  /  Alb  4.9  /  TBili  0.3  /  DBili  x   /  AST  21  /  ALT  15  /  AlkPhos  59  10-25    PT/INR - ( 25 Oct 2024 13:35 )   PT: 11.7 sec;   INR: 1.02 ratio         PTT - ( 25 Oct 2024 13:35 )  PTT:31.0 sec  CARDIAC MARKERS ( 25 Oct 2024 18:51 )  <6 ng/L / x     / x     / x     / x     / x      CARDIAC MARKERS ( 25 Oct 2024 13:35 )  <6 ng/L / x     / x     / x     / x     / 2.1 ng/mL

## 2024-10-26 NOTE — ED CDU PROVIDER SUBSEQUENT DAY NOTE - HISTORY
No interval changes since initial CDU provider note. Pt without new complaint. NAD VSS. no events on tele. Pending echocardiogram, cardiology following. Adilene Zabala

## 2024-10-26 NOTE — ED CDU PROVIDER DISPOSITION NOTE - CLINICAL COURSE
30 y/o male with no pmh presents complaining of chest pain. 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 Pt reports feeling irregular heart beat when the chest pain came. pt states he put himself on a cardiac monitor and it showed multiple pvc's in couplets and triplets. Pt denies any radiation of pain, SOB, dizziness.  no fevers or chills. 28 y/o male with no pmh presents complaining of chest pain. 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 Pt reports feeling irregular heart beat when the chest pain came. pt states he put himself on a cardiac monitor and it showed multiple pvc's in couplets and triplets. Pt denies any radiation of pain, SOB, dizziness.  no fevers or chills.\    no events over tele. echo normal. cleared for discharge per ED attending Dr Caicedo. will have pt f/u cards

## 2024-10-26 NOTE — ED CDU PROVIDER DISPOSITION NOTE - NSFOLLOWUPINSTRUCTIONS_ED_ALL_ED_FT
Hydrate.     We recommend you follow up with your primary care provider within the next 2-3 days, please bring all of your results with you.     You can also follow up with ****CARDS    Please return to the Emergency Department with new, worsening, or concerning symptoms, such as:  -Shortness of breath or trouble breathing  -Pressure, pain, tightness in chest  -Facial drooping, arm weakness, or speech difficulty   -Head injury or loss of consciousness   -Nonstop bleeding or an open wound     *More detailed information regarding your visit and discharge can be found by reviewing this packet Hydrate.     We recommend you follow up with your primary care provider within the next 2-3 days, please bring all of your results with you.     Follow up with cardiology   Jose E Marques  Cardiovascular Disease  [address] [facility], [facility] [address]  [place] [address]  Phone: [phone]    Please return to the Emergency Department with new, worsening, or concerning symptoms, such as:  -Shortness of breath or trouble breathing  -Pressure, pain, tightness in chest

## 2024-10-26 NOTE — ED CDU PROVIDER DISPOSITION NOTE - ATTENDING APP SHARED VISIT CONTRIBUTION OF CARE
GABRIEL: I , Dr Fina Caicedo have seen and evaluated the patient. Results of labs, tests, imaging have been reviewed. The patient reports intermittent symptoms of palpitations. No dizziness or syncope.  The pt had an echo, was seen by cards. The patient is stable for discharge home and will follow up with their primary physician and cards.

## 2024-10-28 PROBLEM — Z78.9 OTHER SPECIFIED HEALTH STATUS: Chronic | Status: ACTIVE | Noted: 2024-10-25

## 2024-10-30 ENCOUNTER — APPOINTMENT (OUTPATIENT)
Dept: ELECTROPHYSIOLOGY | Facility: CLINIC | Age: 29
End: 2024-10-30

## 2024-10-30 ENCOUNTER — NON-APPOINTMENT (OUTPATIENT)
Age: 29
End: 2024-10-30

## 2024-10-30 ENCOUNTER — APPOINTMENT (OUTPATIENT)
Dept: CARDIOLOGY | Facility: CLINIC | Age: 29
End: 2024-10-30
Payer: COMMERCIAL

## 2024-10-30 VITALS
RESPIRATION RATE: 14 BRPM | BODY MASS INDEX: 27.2 KG/M2 | WEIGHT: 190 LBS | DIASTOLIC BLOOD PRESSURE: 66 MMHG | SYSTOLIC BLOOD PRESSURE: 128 MMHG | HEART RATE: 63 BPM | HEIGHT: 70 IN | OXYGEN SATURATION: 96 %

## 2024-10-30 DIAGNOSIS — I49.3 VENTRICULAR PREMATURE DEPOLARIZATION: ICD-10-CM

## 2024-10-30 DIAGNOSIS — R00.2 PALPITATIONS: ICD-10-CM

## 2024-10-30 PROCEDURE — 93242 EXT ECG>48HR<7D RECORDING: CPT

## 2024-10-30 PROCEDURE — 99213 OFFICE O/P EST LOW 20 MIN: CPT

## 2024-11-04 ENCOUNTER — APPOINTMENT (OUTPATIENT)
Dept: CARDIOLOGY | Facility: CLINIC | Age: 29
End: 2024-11-04
Payer: COMMERCIAL

## 2024-11-04 PROCEDURE — 93242 EXT ECG>48HR<7D RECORDING: CPT

## 2024-11-19 ENCOUNTER — NON-APPOINTMENT (OUTPATIENT)
Age: 29
End: 2024-11-19

## 2024-11-19 DIAGNOSIS — I49.3 VENTRICULAR PREMATURE DEPOLARIZATION: ICD-10-CM

## 2024-11-19 DIAGNOSIS — R00.2 PALPITATIONS: ICD-10-CM

## 2024-11-19 PROCEDURE — 93244 EXT ECG>48HR<7D REV&INTERPJ: CPT

## 2025-01-06 ENCOUNTER — NON-APPOINTMENT (OUTPATIENT)
Age: 30
End: 2025-01-06

## 2025-03-24 ENCOUNTER — EMERGENCY (EMERGENCY)
Facility: HOSPITAL | Age: 30
LOS: 1 days | Discharge: HOME | End: 2025-03-24
Attending: EMERGENCY MEDICINE
Payer: SELF-PAY

## 2025-03-24 VITALS
SYSTOLIC BLOOD PRESSURE: 113 MMHG | RESPIRATION RATE: 15 BRPM | OXYGEN SATURATION: 97 % | TEMPERATURE: 98 F | HEART RATE: 55 BPM | DIASTOLIC BLOOD PRESSURE: 74 MMHG

## 2025-03-24 VITALS
RESPIRATION RATE: 16 BRPM | SYSTOLIC BLOOD PRESSURE: 146 MMHG | WEIGHT: 184.97 LBS | OXYGEN SATURATION: 99 % | HEART RATE: 73 BPM | TEMPERATURE: 98 F | HEIGHT: 70 IN | DIASTOLIC BLOOD PRESSURE: 83 MMHG

## 2025-03-24 PROCEDURE — 99283 EMERGENCY DEPT VISIT LOW MDM: CPT

## 2025-03-24 PROCEDURE — 99284 EMERGENCY DEPT VISIT MOD MDM: CPT

## 2025-03-24 RX ORDER — METHOCARBAMOL 500 MG/1
2 TABLET, FILM COATED ORAL
Qty: 18 | Refills: 0
Start: 2025-03-24 | End: 2025-03-26

## 2025-03-24 RX ORDER — ACETAMINOPHEN 500 MG/5ML
975 LIQUID (ML) ORAL ONCE
Refills: 0 | Status: COMPLETED | OUTPATIENT
Start: 2025-03-24 | End: 2025-03-24

## 2025-03-24 RX ORDER — METHOCARBAMOL 500 MG/1
1500 TABLET, FILM COATED ORAL ONCE
Refills: 0 | Status: COMPLETED | OUTPATIENT
Start: 2025-03-24 | End: 2025-03-24

## 2025-03-24 RX ORDER — LIDOCAINE HYDROCHLORIDE 20 MG/ML
1 JELLY TOPICAL ONCE
Refills: 0 | Status: COMPLETED | OUTPATIENT
Start: 2025-03-24 | End: 2025-03-24

## 2025-03-24 RX ORDER — IBUPROFEN 200 MG
600 TABLET ORAL ONCE
Refills: 0 | Status: COMPLETED | OUTPATIENT
Start: 2025-03-24 | End: 2025-03-24

## 2025-03-24 RX ADMIN — Medication 600 MILLIGRAM(S): at 18:33

## 2025-03-24 RX ADMIN — LIDOCAINE HYDROCHLORIDE 1 PATCH: 20 JELLY TOPICAL at 18:33

## 2025-03-24 RX ADMIN — Medication 975 MILLIGRAM(S): at 18:33

## 2025-03-24 RX ADMIN — METHOCARBAMOL 1500 MILLIGRAM(S): 500 TABLET, FILM COATED ORAL at 18:33
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
## 2025-03-24 NOTE — ED PROVIDER NOTE - IN ACCORDANCE WITH [place] STATE LAW, WE OFFER EVERY PATIENT A HEPATITIS C TEST. WOULD YOU LIKE TO BE TESTED TODAY?
Detail Level: Detailed
Quality 128: Preventive Care And Screening: Body Mass Index (Bmi) Screening And Follow-Up Plan: BMI is documented within normal parameters and no follow-up plan is required.
Quality 130: Documentation Of Current Medications In The Medical Record: Current Medications Documented
Quality 431: Preventive Care And Screening: Unhealthy Alcohol Use - Screening: Patient screened for unhealthy alcohol use using a single question and scores less than 2 times per year
Quality 226: Preventive Care And Screening: Tobacco Use: Screening And Cessation Intervention: Patient screened for tobacco and never smoked
Opt out

## 2025-03-24 NOTE — ED PROVIDER NOTE - NSFOLLOWUPINSTRUCTIONS_ED_ALL_ED_FT
YOU WERE SEEN IN THE ED FOR: lower back pain    WHILE YOU WERE HERE, YOU HAD: Tylenol 975mg, Motrin 600mg, Robaxin 1500mg and a lidocaine patch (which you may keep on until 06:30am 3/25/25).  YOU WERE PRESCRIBED: Robaxin  FOLLOW THE INSTRUCTIONS ON THE LABEL/CONTAINER    FOR PAIN, YOU MAY TAKE TYLENOL (ACETAMINOPHEN) AND/OR IBUPROFEN (Advil or Motrin). FOLLOW THE INSTRUCTIONS ON THE LABEL/CONTAINER.  DO NOT EXCEED 4000MG OF TYLENOL (ACETAMINOPHEN) IN A 24 HOUR PERIOD. TAKE IBUPROFEN WITH FOOD.      PLEASE FOLLOW UP WITH YOUR PRIVATE PHYSICIAN WITHIN THE NEXT 72 HOURS. BRING COPIES OF YOUR RESULTS/DISCHARGE PAPERS.    RETURN TO THE EMERGENCY DEPARTMENT IF YOU EXPERIENCE ANY NEW/CONCERNING/WORSENING SYMPTOMS SUCH AS BUT NOT LIMITED TO: chest pain, shortness of breath, severe abdominal pain or back pain, loss of urinary or bowel continence, difficulty urinating (changes in bowel or bladder control), numbness, weakness or tingling in extremities (arms or legs), severe neck pain, increasing pain in any area of your body, blood in your urine, stool, severe headache, fevers, or any other concern.
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
## 2025-03-24 NOTE — ED PROVIDER NOTE - PROGRESS NOTE DETAILS
Attending MD Martino: Feeling improved, will Rx Robaxin, stable for discharge. Follow up instructions given, importance of follow up emphasized, return to ED parameters reviewed and patient verbalized understanding.  All questions answered, all concerns addressed.

## 2025-03-24 NOTE — ED ADULT NURSE NOTE - OBJECTIVE STATEMENT
28 y/o male arrives to the ER complaining of  back pain, Pt reports having lower back pain that started after lifting a heavy object at work 2 hours ago.  Pain is shooting down his left leg is very intense with associated tingling sensation   On assessment pt is well appearing, A&Ox4, speaking coherently, airway is patent, breathing spontaneously and unlabored. Skin is dry, warm. Full ROM in all extremities.

## 2025-03-24 NOTE — ED PROVIDER NOTE - PATIENT PORTAL LINK FT
You can access the FollowMyHealth Patient Portal offered by [facility] by registering at the following website: [email or web address]. By joining [facility]’s FollowMyHealth portal, you will also be able to view your health information using other applications (apps) compatible with our system.
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
## 2025-03-24 NOTE — ED PROVIDER NOTE - PHYSICAL EXAMINATION
Const: Awake, alert, no acute distress.  Appears uncomfortable.  HEENT: NC/AT.  Moist mucous membranes.  Eyes: Extraocular movements intact b/l.  No scleral icterus.  Neck: Full ROM without pain.  Cardiac: Extremities well perfused, normal coloration, no peripheral cyanosis.  No peripheral edema.  Resp: Speaking in full sentences.  No evidence of respiratory distress.  Abd: Non-distended.  Skin: Normal coloration.  No rashes, abrasions or lacerations.  MSK: Midspinal TTP at L4-L5 with no overlying skin changes or palpable deformities. Full strength and sensation intact in bilateral lower extremities. Full ROM at bilateral hips, knees, ankles.  Neuro: Awake, alert & oriented x 3.  Moves all extremities symmetrically.  No obvious focal deficits.